# Patient Record
Sex: FEMALE | Race: WHITE | Employment: FULL TIME | URBAN - METROPOLITAN AREA
[De-identification: names, ages, dates, MRNs, and addresses within clinical notes are randomized per-mention and may not be internally consistent; named-entity substitution may affect disease eponyms.]

---

## 2022-01-17 ENCOUNTER — HOSPITAL ENCOUNTER (EMERGENCY)
Age: 62
Discharge: HOME | End: 2022-01-17
Payer: COMMERCIAL

## 2022-01-17 VITALS
HEIGHT: 69 IN | BODY MASS INDEX: 16.29 KG/M2 | SYSTOLIC BLOOD PRESSURE: 109 MMHG | OXYGEN SATURATION: 97 % | RESPIRATION RATE: 16 BRPM | TEMPERATURE: 98.5 F | HEART RATE: 73 BPM | DIASTOLIC BLOOD PRESSURE: 64 MMHG | WEIGHT: 110 LBS

## 2022-01-17 DIAGNOSIS — H66.90 ACUTE OTITIS MEDIA, UNSPECIFIED OTITIS MEDIA TYPE: Primary | ICD-10-CM

## 2022-01-17 PROCEDURE — 6370000000 HC RX 637 (ALT 250 FOR IP): Performed by: PHYSICIAN ASSISTANT

## 2022-01-17 PROCEDURE — 99283 EMERGENCY DEPT VISIT LOW MDM: CPT

## 2022-01-17 RX ORDER — AMOXICILLIN 500 MG/1
500 CAPSULE ORAL 3 TIMES DAILY
Qty: 21 CAPSULE | Refills: 0 | Status: SHIPPED | OUTPATIENT
Start: 2022-01-17 | End: 2022-01-24

## 2022-01-17 RX ADMIN — IBUPROFEN 600 MG: 200 TABLET, FILM COATED ORAL at 19:49

## 2022-01-17 ASSESSMENT — PAIN DESCRIPTION - LOCATION
LOCATION: EAR
LOCATION: EAR

## 2022-01-17 ASSESSMENT — PAIN DESCRIPTION - DIRECTION: RADIATING_TOWARDS: BILATERAL EAR PAIN

## 2022-01-17 ASSESSMENT — ENCOUNTER SYMPTOMS
VOMITING: 0
COUGH: 0
GASTROINTESTINAL NEGATIVE: 1
SORE THROAT: 0
RESPIRATORY NEGATIVE: 1
DIARRHEA: 0
SHORTNESS OF BREATH: 0

## 2022-01-17 ASSESSMENT — PAIN SCALES - GENERAL
PAINLEVEL_OUTOF10: 10

## 2022-01-17 ASSESSMENT — PAIN DESCRIPTION - PAIN TYPE: TYPE: ACUTE PAIN

## 2022-01-17 ASSESSMENT — PAIN DESCRIPTION - ORIENTATION: ORIENTATION: RIGHT;LEFT

## 2022-01-18 NOTE — ED PROVIDER NOTES
[address] [facility]          PHYSICIAN ASSISTANT NOTE       Date of evaluation: 1/17/2022    Chief Complaint     Otalgia (pt c/o pain in both ears with a change in hearing)      History of Present Illness     Alexa Ryder is a 64 y.o. female who presents with otalgia. Patient presents with bilateral ear pain for about 1 week. Patient reports she thinks she has bilateral ear infections. Patient reports she does not get ear infections very often. Patient reports she has had some nasal congestion and chills. No cough, chest pain, difficulty breathing. Patient has not taken anything for symptoms. Patient is not diabetic. Otherwise well. Review of Systems     Review of Systems   Constitutional: Negative. Negative for fever. HENT: Positive for ear pain. Negative for sore throat. Respiratory: Negative. Negative for cough and shortness of breath. Cardiovascular: Negative. Gastrointestinal: Negative. Negative for diarrhea and vomiting. Musculoskeletal: Negative. Skin: Negative. Neurological: Negative. All other systems reviewed and are negative. Past Medical, Surgical, Family, and Social History     She has a past medical history of Difficulty swallowing and GERD (gastroesophageal reflux disease). She has no past surgical history on file. Her family history is not on file. She reports that she has been smoking cigarettes. She has been smoking about 1.00 pack per day. She has never used smokeless tobacco. She reports that she does not drink alcohol and does not use drugs. Medications     Discharge Medication List as of 1/17/2022  7:43 PM      CONTINUE these medications which have NOT CHANGED    Details   HYDROcodone-acetaminophen (NORCO) 5-325 MG per tablet Take 1 tablet by mouth every 6 hours as needed. QUEtiapine (SEROQUEL) 50 MG tablet Take 50 mg by mouth every evening.       LORazepam (ATIVAN) 1 MG tablet Take 1 mg by mouth every 6 hours as needed. Allergies     She has No Known Allergies. Physical Exam     INITIAL VITALS: BP: 138/68, Temp: 98.5 °F (36.9 °C), Pulse: 79, Resp: 15, SpO2: 98 %  Physical Exam  Vitals and nursing note reviewed. Constitutional:       General: She is not in acute distress. Appearance: She is not ill-appearing. HENT:      Head: Normocephalic and atraumatic. Right Ear: Ear canal and external ear normal.      Left Ear: Ear canal and external ear normal.      Ears:      Comments: Bilateral tympanic membranes erythematous and dull. No tympanic membrane rupture visualized. No tenderness to tragus or mastoid. Eyes:      Extraocular Movements: Extraocular movements intact. Conjunctiva/sclera: Conjunctivae normal.      Pupils: Pupils are equal, round, and reactive to light. Cardiovascular:      Rate and Rhythm: Normal rate and regular rhythm. Pulmonary:      Effort: Pulmonary effort is normal.      Breath sounds: Normal breath sounds. Musculoskeletal:      Cervical back: Neck supple. Skin:     General: Skin is warm and dry. Neurological:      General: No focal deficit present. Mental Status: She is alert and oriented to person, place, and time. Mental status is at baseline. Psychiatric:         Mood and Affect: Mood normal.         Behavior: Behavior normal.         Thought Content: Thought content normal.         Judgment: Judgment normal.         Diagnostic Results     RADIOLOGY:  No orders to display       LABS:   No results found for this visit on 01/17/22. ED BEDSIDE ULTRASOUND:      RECENT VITALS:  BP: 109/64, Temp: 98.5 °F (36.9 °C), Pulse: 73, Resp: 16, SpO2: 97 %     Procedures         ED Course     Nursing Notes, Past Medical Hx,Past Surgical Hx, Social Hx, Allergies, and Family Hx were reviewed.     The patient was given the following medications:  Orders Placed This Encounter   Medications    amoxicillin (AMOXIL) 500 MG capsule     Sig: Take 1 capsule by mouth 3 times daily for 7 days     Dispense:  21 capsule     Refill:  0    ibuprofen (ADVIL;MOTRIN) tablet 600 mg       CONSULTS:  None    MEDICAL DECISION MAKING / ASSESSMENT / PLAN     Amanda Roman is a 64 y.o. female with ear pain. Patient is well-appearing and in no acute distress. Vitals are normal.  Physical exam is consistent with bilateral otitis media. Patient was offered an influenza and COVID swab but declines. Patient is systemically well and tolerating oral intake. No signs of mastoiditis. Patient be treated with antibiotics. Encourage follow-up with ENT. Return precautions discussed. Clinical Impression     1.  Acute otitis media, unspecified otitis media type        Disposition     PATIENT REFERRED TO:  Sylvie Renteria MD  [address]  [address] [phone]    Schedule an appointment as soon as possible for a visit       [facility] Emergency Department  [address]  [phone]    As needed, If symptoms worsen      DISCHARGE MEDICATIONS:  Discharge Medication List as of 1/17/2022  7:43 PM      START taking these medications    Details   amoxicillin (AMOXIL) 500 MG capsule Take 1 capsule by mouth 3 times daily for 7 days, Disp-21 capsule, R-0Print             DISPOSITION Decision To Discharge 01/17/2022 07:53:13 PM        Karime Vargas PA-C  01/17/22 4905

## 2022-01-18 NOTE — ED NOTES
All discharge paperwork and follow-up instructions reviewed with pt. Pt verbalized understanding. Pt ambulatory upon discharge in stable condition.        Cristiane Meraz RN  01/17/22 [id number]

## 2022-10-15 ENCOUNTER — HOSPITAL ENCOUNTER (EMERGENCY)
Age: 62
Discharge: LEFT BEFORE DISCHARGE | End: 2022-10-15
Attending: EMERGENCY MEDICINE
Payer: COMMERCIAL

## 2022-10-15 ENCOUNTER — APPOINTMENT (OUTPATIENT)
Dept: GENERAL RADIOLOGY | Age: 62
End: 2022-10-15
Payer: COMMERCIAL

## 2022-10-15 VITALS
SYSTOLIC BLOOD PRESSURE: 130 MMHG | WEIGHT: 110 LBS | HEART RATE: 76 BPM | TEMPERATURE: 98.2 F | OXYGEN SATURATION: 99 % | BODY MASS INDEX: 16.29 KG/M2 | RESPIRATION RATE: 16 BRPM | HEIGHT: 69 IN | DIASTOLIC BLOOD PRESSURE: 70 MMHG

## 2022-10-15 DIAGNOSIS — R06.02 SHORTNESS OF BREATH: Primary | ICD-10-CM

## 2022-10-15 LAB
A/G RATIO: 2.4 (ref 1.1–2.2)
ALBUMIN SERPL-MCNC: 4.6 G/DL (ref 3.4–5)
ALP BLD-CCNC: 63 U/L (ref 40–129)
ALT SERPL-CCNC: 12 U/L (ref 10–40)
ANION GAP SERPL CALCULATED.3IONS-SCNC: 6 MMOL/L (ref 3–16)
AST SERPL-CCNC: 15 U/L (ref 15–37)
BASOPHILS ABSOLUTE: 0 K/UL (ref 0–0.2)
BASOPHILS RELATIVE PERCENT: 0.7 %
BILIRUB SERPL-MCNC: <0.2 MG/DL (ref 0–1)
BUN BLDV-MCNC: 14 MG/DL (ref 7–20)
CALCIUM SERPL-MCNC: 9.2 MG/DL (ref 8.3–10.6)
CHLORIDE BLD-SCNC: 104 MMOL/L (ref 99–110)
CO2: 29 MMOL/L (ref 21–32)
CREAT SERPL-MCNC: 0.8 MG/DL (ref 0.6–1.2)
EOSINOPHILS ABSOLUTE: 0 K/UL (ref 0–0.6)
EOSINOPHILS RELATIVE PERCENT: 0.4 %
GFR AFRICAN AMERICAN: >60
GFR NON-AFRICAN AMERICAN: >60
GLUCOSE BLD-MCNC: 105 MG/DL (ref 70–99)
HCT VFR BLD CALC: 37.2 % (ref 36–48)
HEMOGLOBIN: 12.6 G/DL (ref 12–16)
INFLUENZA A: NOT DETECTED
INFLUENZA B: NOT DETECTED
LYMPHOCYTES ABSOLUTE: 0.7 K/UL (ref 1–5.1)
LYMPHOCYTES RELATIVE PERCENT: 13.1 %
MCH RBC QN AUTO: 30.4 PG (ref 26–34)
MCHC RBC AUTO-ENTMCNC: 33.8 G/DL (ref 31–36)
MCV RBC AUTO: 89.8 FL (ref 80–100)
MONOCYTES ABSOLUTE: 0.4 K/UL (ref 0–1.3)
MONOCYTES RELATIVE PERCENT: 7.3 %
NEUTROPHILS ABSOLUTE: 4 K/UL (ref 1.7–7.7)
NEUTROPHILS RELATIVE PERCENT: 78.5 %
PDW BLD-RTO: 14.3 % (ref 12.4–15.4)
PLATELET # BLD: 257 K/UL (ref 135–450)
PMV BLD AUTO: 7.9 FL (ref 5–10.5)
POTASSIUM REFLEX MAGNESIUM: 4 MMOL/L (ref 3.5–5.1)
RBC # BLD: 4.14 M/UL (ref 4–5.2)
SARS-COV-2 RNA, RT PCR: NOT DETECTED
SODIUM BLD-SCNC: 139 MMOL/L (ref 136–145)
TOTAL PROTEIN: 6.5 G/DL (ref 6.4–8.2)
TROPONIN: <0.01 NG/ML
WBC # BLD: 5.1 K/UL (ref 4–11)

## 2022-10-15 PROCEDURE — 84484 ASSAY OF TROPONIN QUANT: CPT

## 2022-10-15 PROCEDURE — 85025 COMPLETE CBC W/AUTO DIFF WBC: CPT

## 2022-10-15 PROCEDURE — 80053 COMPREHEN METABOLIC PANEL: CPT

## 2022-10-15 PROCEDURE — 94664 DEMO&/EVAL PT USE INHALER: CPT

## 2022-10-15 PROCEDURE — 93005 ELECTROCARDIOGRAM TRACING: CPT | Performed by: EMERGENCY MEDICINE

## 2022-10-15 PROCEDURE — 87636 SARSCOV2 & INF A&B AMP PRB: CPT

## 2022-10-15 PROCEDURE — 6360000002 HC RX W HCPCS: Performed by: EMERGENCY MEDICINE

## 2022-10-15 PROCEDURE — 94640 AIRWAY INHALATION TREATMENT: CPT

## 2022-10-15 PROCEDURE — 6370000000 HC RX 637 (ALT 250 FOR IP): Performed by: EMERGENCY MEDICINE

## 2022-10-15 PROCEDURE — 99284 EMERGENCY DEPT VISIT MOD MDM: CPT

## 2022-10-15 RX ORDER — ALBUTEROL SULFATE 2.5 MG/3ML
2.5 SOLUTION RESPIRATORY (INHALATION) ONCE
Status: COMPLETED | OUTPATIENT
Start: 2022-10-15 | End: 2022-10-15

## 2022-10-15 RX ORDER — IPRATROPIUM BROMIDE AND ALBUTEROL SULFATE 2.5; .5 MG/3ML; MG/3ML
1 SOLUTION RESPIRATORY (INHALATION) ONCE
Status: COMPLETED | OUTPATIENT
Start: 2022-10-15 | End: 2022-10-15

## 2022-10-15 RX ADMIN — ALBUTEROL SULFATE 2.5 MG: 2.5 SOLUTION RESPIRATORY (INHALATION) at 16:10

## 2022-10-15 RX ADMIN — ALBUTEROL SULFATE 2.5 MG: 2.5 SOLUTION RESPIRATORY (INHALATION) at 16:11

## 2022-10-15 RX ADMIN — IPRATROPIUM BROMIDE AND ALBUTEROL SULFATE 1 AMPULE: .5; 3 SOLUTION RESPIRATORY (INHALATION) at 16:10

## 2022-10-15 ASSESSMENT — PAIN - FUNCTIONAL ASSESSMENT: PAIN_FUNCTIONAL_ASSESSMENT: NONE - DENIES PAIN

## 2022-10-15 NOTE — ED PROVIDER NOTES
[address] [facility]          ATTENDING PHYSICIAN NOTE       Date of evaluation: 10/15/2022    Chief Complaint     Shortness of Breath (For about a month patient has been more SOB, +COPD, worse with exacerbation)      History of Present Illness     Jacy Urias is a 58 y.o. femalewith a past medical history of COPD, GERD, dysphagia presents to the emergency department today with a month of shortness of breath. This is getting progressively worse. She has a Combivent inhaler, she used it twice today. She is now out of her inhaler. Due to worsening shortness of breath, she is presenting to the emergency department. No sputum production. She has had intermittent cough. This does feel like her COPD but feels worse than other episodes that she has had in the past.  No orthopnea. She does have some discomfort in the center of her chest when she takes of breath. Nothing else makes it better or worse. Subjective fevers at home. She has not checked her temperature. She is eating and drinking normally. She is voiding normally and having normal bowel movements. No headache, vision changes, abdominal pain, nausea, vomiting, diarrhea, constipation. Review of Systems     As documented in HPI, otherwise all other systems were reviewed and negative    Past Medical, Surgical, Family, and Social History     She has a past medical history of Difficulty swallowing and GERD (gastroesophageal reflux disease). She has no past surgical history on file. Her family history is not on file. She reports that she has been smoking cigarettes. She has been smoking an average of 1 pack per day. She has never used smokeless tobacco. She reports that she does not drink alcohol and does not use drugs. Medications     Previous Medications    HYDROCODONE-ACETAMINOPHEN (NORCO) 5-325 MG PER TABLET    Take 1 tablet by mouth every 6 hours as needed.     LORAZEPAM (ATIVAN) 1 MG TABLET    Take 1 mg by mouth every 6 hours as needed. QUETIAPINE (SEROQUEL) 50 MG TABLET    Take 50 mg by mouth every evening. Allergies     She has No Known Allergies. Physical Exam     INITIAL VITALS: BP: 130/70, Temp: 98.2 °F (36.8 °C), Heart Rate: 70, Resp: 18, SpO2: 100 %   General: No acute distress  HEENT: head is atraumatic, pupils equal round and reactive to light, sclera are clear, oropharynx is nonerythematous  Neck: Supple, no lymphadenopathy  Chest: Nonlabored respirations, equal chest rise bilaterally, no accessory muscle use. Diminished breath sounds bilaterally. Cardiovascular: Normal rate, regular rhythm, 2+ radial pulses bilaterally  Abdominal: Soft, nontender, nondistended, no rebound or guarding  Back: No CVA tenderness bilaterally  Skin: Warm, dry, well-perfused, no rashes  Musculoskeletal: No obvious deformities, no tenderness to palpation diffusely  Neurologic: Alert and oriented x4, speech is clear and intact without dysarthria, moving all extremities normally  Psych: Anxious mood and affect  Diagnostic Results     EKG   Twelve-lead EKG performed on 10/15/2022 at 1549 hrs. Sinus rhythm at a rate of 62. Normal axis. Good R wave progression. No significant Q waves noted. No ST segment changes noted. No T wave inversions noted. QTc normal at 401. QRS normal at 84. FL interval normal at 130. No STEMI.     RADIOLOGY:  XR CHEST (2 VW)    (Results Pending)       LABS:   Results for orders placed or performed during the hospital encounter of 10/15/22   CBC with Auto Differential   Result Value Ref Range    WBC 5.1 4.0 - 11.0 K/uL    RBC 4.14 4.00 - 5.20 M/uL    Hemoglobin 12.6 12.0 - 16.0 g/dL    Hematocrit 37.2 36.0 - 48.0 %    MCV 89.8 80.0 - 100.0 fL    MCH 30.4 26.0 - 34.0 pg    MCHC 33.8 31.0 - 36.0 g/dL    RDW 14.3 12.4 - 15.4 %    Platelets 717 045 - 634 K/uL    MPV 7.9 5.0 - 10.5 fL    Neutrophils % 78.5 %    Lymphocytes % 13.1 %    Monocytes % 7.3 %    Eosinophils % 0.4 %    Basophils % 0.7 %    Neutrophils Absolute 4.0 1.7 - 7.7 K/uL    Lymphocytes Absolute 0.7 (L) 1.0 - 5.1 K/uL    Monocytes Absolute 0.4 0.0 - 1.3 K/uL    Eosinophils Absolute 0.0 0.0 - 0.6 K/uL    Basophils Absolute 0.0 0.0 - 0.2 K/uL   Troponin   Result Value Ref Range    Troponin <0.01 <0.01 ng/mL       ED BEDSIDE ULTRASOUND:  No results found. RECENT VITALS:  BP: 130/70,Temp: 98.2 °F (36.8 °C), Heart Rate: 76, Resp: 16, SpO2: 99 %     Procedures     Not applicable    ED Course     Nursing Notes, Past Medical Hx, Past Surgical Hx, Social Hx,Allergies, and Family Hx were reviewed. patient was given the following medications:  Orders Placed This Encounter   Medications    ipratropium-albuterol (DUONEB) nebulizer solution 1 ampule     Order Specific Question:   Initiate RT Bronchodilator Protocol     Answer:   No    albuterol (PROVENTIL) nebulizer solution 2.5 mg     Order Specific Question:   Initiate RT Bronchodilator Protocol     Answer:   No    albuterol (PROVENTIL) nebulizer solution 2.5 mg     Order Specific Question:   Initiate RT Bronchodilator Protocol     Answer:   No       CONSULTS:  None    MEDICAL DECISIONMAKING / ASSESSMENT / PLAN     Differential Diagnosis: COPD exacerbation, pneumonia, pneumothorax, ACS, arrhythmia, dehydration, electrolyte abnormality, COVID, influenza    Marleni Joshi is a 58 y.o. femalewith a past medical history of COPD, GERD, dysphagia presents to the emergency department today with a month of shortness of breath. She arrives to the emergency department her ABCs intact. Her vital signs are within normal limits. She has diminished breath sounds bilaterally. She does not appear to be in any obvious respiratory distress. Her Wells score for pulmonary embolism is 0, given that she has symptoms consistent with her prior COPD exacerbations, I feel that she does not require work-up for this today. I have ordered breathing treatments, basic labs.   CBC was negative for leukocytosis, anemia, thrombocytopenia. Prior to the remainder of her labs returning, was contacted by the RT who states while she was receiving the breathing treatments, she stated \"my daughters cannot leave me. \"  She then took off the breathing treatment and eloped from the emergency department. Clinical Impression     1. Shortness of breath        Disposition     PATIENT REFERRED TO:  No follow-up provider specified.     DISCHARGE MEDICATIONS:  New Prescriptions    No medications on file       DISPOSITION Eloped - Left Before Treatment Complete 10/15/2022 04:26:25 PM         Dwan Homans, MD  10/15/22 9940

## 2022-10-15 NOTE — PROGRESS NOTES
I was in the room with the patient giving her a breathing treatment when she starting acting nervous and anxious. The patient stated her daughter was outside and was going to leave her, the pt then just got up threw her leads and breathing treatment on the bed and ran out the door. I informed the MD of what happened.

## 2022-10-16 LAB
EKG ATRIAL RATE: 62 BPM
EKG DIAGNOSIS: NORMAL
EKG P AXIS: 80 DEGREES
EKG P-R INTERVAL: 130 MS
EKG Q-T INTERVAL: 396 MS
EKG QRS DURATION: 84 MS
EKG QTC CALCULATION (BAZETT): 401 MS
EKG R AXIS: 56 DEGREES
EKG T AXIS: 57 DEGREES
EKG VENTRICULAR RATE: 62 BPM

## 2024-03-10 ENCOUNTER — HOSPITAL ENCOUNTER (EMERGENCY)
Age: 64
Discharge: HOME | End: 2024-03-10
Attending: EMERGENCY MEDICINE
Payer: COMMERCIAL

## 2024-03-10 VITALS
SYSTOLIC BLOOD PRESSURE: 157 MMHG | TEMPERATURE: 98.5 F | HEART RATE: 73 BPM | OXYGEN SATURATION: 97 % | HEIGHT: 69 IN | WEIGHT: 97.3 LBS | BODY MASS INDEX: 14.41 KG/M2 | DIASTOLIC BLOOD PRESSURE: 76 MMHG | RESPIRATION RATE: 18 BRPM

## 2024-03-10 DIAGNOSIS — Z20.828 EXPOSURE TO INFLUENZA: Primary | ICD-10-CM

## 2024-03-10 LAB
BILIRUB UR QL STRIP.AUTO: NEGATIVE
CLARITY UR: CLEAR
COLOR UR: YELLOW
FLUAV RNA UPPER RESP QL NAA+PROBE: NEGATIVE
FLUBV AG NPH QL: NEGATIVE
GLUCOSE UR STRIP.AUTO-MCNC: NEGATIVE MG/DL
HGB UR QL STRIP.AUTO: NEGATIVE
KETONES UR STRIP.AUTO-MCNC: NEGATIVE MG/DL
LEUKOCYTE ESTERASE UR QL STRIP.AUTO: ABNORMAL
NITRITE UR QL STRIP.AUTO: NEGATIVE
PH UR STRIP.AUTO: 5.5 [PH] (ref 5–8)
PROT UR STRIP.AUTO-MCNC: NEGATIVE MG/DL
RBC #/AREA URNS HPF: NORMAL /HPF (ref 0–4)
SARS-COV-2 RDRP RESP QL NAA+PROBE: NOT DETECTED
SP GR UR STRIP.AUTO: 1.01 (ref 1–1.03)
UA COMPLETE W REFLEX CULTURE PNL UR: ABNORMAL
UA DIPSTICK W REFLEX MICRO PNL UR: YES
URN SPEC COLLECT METH UR: ABNORMAL
UROBILINOGEN UR STRIP-ACNC: 0.2 E.U./DL
WBC #/AREA URNS HPF: NORMAL /HPF (ref 0–5)

## 2024-03-10 PROCEDURE — 87804 INFLUENZA ASSAY W/OPTIC: CPT

## 2024-03-10 PROCEDURE — 87635 SARS-COV-2 COVID-19 AMP PRB: CPT

## 2024-03-10 PROCEDURE — 99283 EMERGENCY DEPT VISIT LOW MDM: CPT

## 2024-03-10 PROCEDURE — 81001 URINALYSIS AUTO W/SCOPE: CPT

## 2024-03-10 RX ORDER — OSELTAMIVIR PHOSPHATE 75 MG/1
75 CAPSULE ORAL 2 TIMES DAILY
Qty: 10 CAPSULE | Refills: 0 | Status: SHIPPED | OUTPATIENT
Start: 2024-03-10 | End: 2024-03-15

## 2024-03-10 ASSESSMENT — PAIN - FUNCTIONAL ASSESSMENT
PAIN_FUNCTIONAL_ASSESSMENT: ACTIVITIES ARE NOT PREVENTED
PAIN_FUNCTIONAL_ASSESSMENT: 0-10

## 2024-03-10 ASSESSMENT — PAIN DESCRIPTION - LOCATION: LOCATION: FLANK

## 2024-03-10 ASSESSMENT — PAIN SCALES - GENERAL: PAINLEVEL_OUTOF10: 9

## 2024-03-10 ASSESSMENT — PAIN DESCRIPTION - DESCRIPTORS: DESCRIPTORS: ACHING

## 2024-03-10 ASSESSMENT — PAIN DESCRIPTION - PAIN TYPE: TYPE: ACUTE PAIN

## 2024-03-10 ASSESSMENT — PAIN DESCRIPTION - FREQUENCY: FREQUENCY: CONTINUOUS

## 2024-03-10 ASSESSMENT — PAIN DESCRIPTION - ORIENTATION: ORIENTATION: RIGHT;LEFT

## 2024-03-10 NOTE — DISCHARGE INSTRUCTIONS
As discussed, you are started on the antiviral medicine for the fluid because of exposure.  Your flu test and COVID test were negative here however this may be falsely negative.  You may continue to take over-the-counter medicines.  Please follow-up with your primary care doctor.

## 2024-03-10 NOTE — ED PROVIDER NOTES
EMERGENCY DEPARTMENT ENCOUNTER     [facility] EMERGENCY DEPARTMENT     Pt Name: Danielle Cordoba   MRN: [id number]   Birthdate 1960   Date of evaluation: 3/10/2024   Provider: Miguel Londono MD   PCP: Caitlyn Cardona   Note Started: 3:59 PM EDT 3/10/24     CHIEF COMPLAINT     Chief Complaint   Patient presents with    Generalized Body Aches    Fatigue    Back Pain    Chills     Houseful of ill people her daughter is positive for Flu and COVID         HISTORY OF PRESENT ILLNESS:  History from : Patient   Limitations to history : None     Danielle Cordoba is a 63 y.o. female who presents for evaluation of fatigue, chills.  Patient reports that multiple individuals in her home have either flu or COVID.  Her symptoms started about 2 days ago.  She states that she has history of COPD however remains fairly active.  She denies any pain with urination.  No history of nausea vomiting.  No chest pain.    Nursing Notes were all reviewed and agreed with or any disagreements were addressed in the HPI.     ROS: Positives and Pertinent negatives as per HPI.    PAST MEDICAL HISTORY     Past medical history:  has a past medical history of COPD (chronic obstructive pulmonary disease) (HCC), Difficulty swallowing, and GERD (gastroesophageal reflux disease).    Past surgical history:  has no past surgical history on file.      PHYSICAL EXAM:  ED Triage Vitals [03/10/24 1522]   BP Temp Temp Source Pulse Respirations SpO2 Height Weight - Scale   (!) 157/76 98.5 °F (36.9 °C) Oral 73 18 97 % 1.753 m (5' 9\") 44.1 kg (97 lb 4.8 oz)        Physical Exam   No acute distress no respiratory distress clear breath sounds bilaterally.  There is no significant CVA tenderness.  Nontoxic in appearance conversant in full sentences.    DIAGNOSTIC RESULTS   LABS:   Labs Reviewed   URINALYSIS WITH REFLEX TO CULTURE - Abnormal; Notable for the following components:       Result Value    Leukocyte Esterase, Urine TRACE (*)     All

## 2024-04-28 ENCOUNTER — HOSPITAL ENCOUNTER (INPATIENT)
Age: 64
LOS: 1 days | Discharge: HOME | DRG: 463 | End: 2024-04-29
Attending: STUDENT IN AN ORGANIZED HEALTH CARE EDUCATION/TRAINING PROGRAM | Admitting: INTERNAL MEDICINE
Payer: COMMERCIAL

## 2024-04-28 ENCOUNTER — APPOINTMENT (OUTPATIENT)
Dept: CT IMAGING | Age: 64
DRG: 463 | End: 2024-04-28
Payer: COMMERCIAL

## 2024-04-28 DIAGNOSIS — N12 PYELONEPHRITIS: Primary | ICD-10-CM

## 2024-04-28 PROBLEM — R10.9 ACUTE LEFT FLANK PAIN: Status: ACTIVE | Noted: 2024-04-28

## 2024-04-28 PROBLEM — J44.9 COPD WITHOUT EXACERBATION (HCC): Status: ACTIVE | Noted: 2024-04-28

## 2024-04-28 LAB
ALBUMIN SERPL-MCNC: 4 G/DL (ref 3.4–5)
ALBUMIN/GLOB SERPL: 1.3 {RATIO} (ref 1.1–2.2)
ALP SERPL-CCNC: 90 U/L (ref 40–129)
ALT SERPL-CCNC: 10 U/L (ref 10–40)
ANION GAP SERPL CALCULATED.3IONS-SCNC: 11 MMOL/L (ref 3–16)
AST SERPL-CCNC: 10 U/L (ref 15–37)
BACTERIA URNS QL MICRO: ABNORMAL /HPF
BASOPHILS # BLD: 0.1 K/UL (ref 0–0.2)
BASOPHILS NFR BLD: 0.5 %
BILIRUB SERPL-MCNC: 0.6 MG/DL (ref 0–1)
BILIRUB UR QL STRIP.AUTO: NEGATIVE
BUN SERPL-MCNC: 12 MG/DL (ref 7–20)
CALCIUM SERPL-MCNC: 9.2 MG/DL (ref 8.3–10.6)
CHLORIDE SERPL-SCNC: 101 MMOL/L (ref 99–110)
CLARITY UR: CLEAR
CO2 SERPL-SCNC: 25 MMOL/L (ref 21–32)
COLOR UR: YELLOW
CREAT SERPL-MCNC: 0.8 MG/DL (ref 0.6–1.2)
DEPRECATED RDW RBC AUTO: 14.8 % (ref 12.4–15.4)
EOSINOPHIL # BLD: 0 K/UL (ref 0–0.6)
EOSINOPHIL NFR BLD: 0.1 %
EPI CELLS #/AREA URNS HPF: ABNORMAL /HPF (ref 0–5)
GFR SERPLBLD CREATININE-BSD FMLA CKD-EPI: 82 ML/MIN/{1.73_M2}
GLUCOSE SERPL-MCNC: 127 MG/DL (ref 70–99)
GLUCOSE UR STRIP.AUTO-MCNC: NEGATIVE MG/DL
HCT VFR BLD AUTO: 38.6 % (ref 36–48)
HGB BLD-MCNC: 12.8 G/DL (ref 12–16)
HGB UR QL STRIP.AUTO: ABNORMAL
KETONES UR STRIP.AUTO-MCNC: NEGATIVE MG/DL
LACTATE BLDV-SCNC: 0.9 MMOL/L (ref 0.4–2)
LEUKOCYTE ESTERASE UR QL STRIP.AUTO: ABNORMAL
LIPASE SERPL-CCNC: 14 U/L (ref 13–60)
LYMPHOCYTES # BLD: 0.9 K/UL (ref 1–5.1)
LYMPHOCYTES NFR BLD: 6.5 %
MCH RBC QN AUTO: 28.9 PG (ref 26–34)
MCHC RBC AUTO-ENTMCNC: 33.1 G/DL (ref 31–36)
MCV RBC AUTO: 87.3 FL (ref 80–100)
MONOCYTES # BLD: 1.3 K/UL (ref 0–1.3)
MONOCYTES NFR BLD: 8.9 %
NEUTROPHILS # BLD: 11.9 K/UL (ref 1.7–7.7)
NEUTROPHILS NFR BLD: 84 %
NITRITE UR QL STRIP.AUTO: POSITIVE
PH UR STRIP.AUTO: 7 [PH] (ref 5–8)
PLATELET # BLD AUTO: 361 K/UL (ref 135–450)
PMV BLD AUTO: 8 FL (ref 5–10.5)
POTASSIUM SERPL-SCNC: 4.3 MMOL/L (ref 3.5–5.1)
PROT SERPL-MCNC: 7.2 G/DL (ref 6.4–8.2)
PROT UR STRIP.AUTO-MCNC: 100 MG/DL
RBC # BLD AUTO: 4.42 M/UL (ref 4–5.2)
RBC #/AREA URNS HPF: >100 /HPF (ref 0–4)
SODIUM SERPL-SCNC: 137 MMOL/L (ref 136–145)
SP GR UR STRIP.AUTO: 1.02 (ref 1–1.03)
UA COMPLETE W REFLEX CULTURE PNL UR: YES
UA DIPSTICK W REFLEX MICRO PNL UR: YES
URN SPEC COLLECT METH UR: ABNORMAL
UROBILINOGEN UR STRIP-ACNC: 1 E.U./DL
WBC # BLD AUTO: 14.2 K/UL (ref 4–11)
WBC #/AREA URNS HPF: >100 /HPF (ref 0–5)

## 2024-04-28 PROCEDURE — 6360000002 HC RX W HCPCS: Performed by: INTERNAL MEDICINE

## 2024-04-28 PROCEDURE — 2580000003 HC RX 258: Performed by: INTERNAL MEDICINE

## 2024-04-28 PROCEDURE — 6370000000 HC RX 637 (ALT 250 FOR IP): Performed by: INTERNAL MEDICINE

## 2024-04-28 PROCEDURE — 87088 URINE BACTERIA CULTURE: CPT

## 2024-04-28 PROCEDURE — 96374 THER/PROPH/DIAG INJ IV PUSH: CPT

## 2024-04-28 PROCEDURE — 87086 URINE CULTURE/COLONY COUNT: CPT

## 2024-04-28 PROCEDURE — 96375 TX/PRO/DX INJ NEW DRUG ADDON: CPT

## 2024-04-28 PROCEDURE — 1200000000 HC SEMI PRIVATE

## 2024-04-28 PROCEDURE — 94640 AIRWAY INHALATION TREATMENT: CPT

## 2024-04-28 PROCEDURE — 85025 COMPLETE CBC W/AUTO DIFF WBC: CPT

## 2024-04-28 PROCEDURE — 2580000003 HC RX 258: Performed by: STUDENT IN AN ORGANIZED HEALTH CARE EDUCATION/TRAINING PROGRAM

## 2024-04-28 PROCEDURE — 87186 SC STD MICRODIL/AGAR DIL: CPT

## 2024-04-28 PROCEDURE — 99285 EMERGENCY DEPT VISIT HI MDM: CPT

## 2024-04-28 PROCEDURE — 83605 ASSAY OF LACTIC ACID: CPT

## 2024-04-28 PROCEDURE — 81001 URINALYSIS AUTO W/SCOPE: CPT

## 2024-04-28 PROCEDURE — 80053 COMPREHEN METABOLIC PANEL: CPT

## 2024-04-28 PROCEDURE — 6360000002 HC RX W HCPCS: Performed by: STUDENT IN AN ORGANIZED HEALTH CARE EDUCATION/TRAINING PROGRAM

## 2024-04-28 PROCEDURE — 83690 ASSAY OF LIPASE: CPT

## 2024-04-28 PROCEDURE — 74176 CT ABD & PELVIS W/O CONTRAST: CPT

## 2024-04-28 RX ORDER — MORPHINE SULFATE 4 MG/ML
4 INJECTION INTRAVENOUS ONCE
Status: COMPLETED | OUTPATIENT
Start: 2024-04-28 | End: 2024-04-28

## 2024-04-28 RX ORDER — CEFEPIME 1 G/50ML
2000 INJECTION, SOLUTION INTRAVENOUS EVERY 24 HOURS
Status: DISCONTINUED | OUTPATIENT
Start: 2024-04-29 | End: 2024-04-29 | Stop reason: HOSPADM

## 2024-04-28 RX ORDER — ONDANSETRON 2 MG/ML
4 INJECTION INTRAMUSCULAR; INTRAVENOUS EVERY 6 HOURS PRN
Status: DISCONTINUED | OUTPATIENT
Start: 2024-04-28 | End: 2024-04-29 | Stop reason: HOSPADM

## 2024-04-28 RX ORDER — CEFEPIME 1 G/50ML
2000 INJECTION, SOLUTION INTRAVENOUS ONCE
Status: COMPLETED | OUTPATIENT
Start: 2024-04-28 | End: 2024-04-28

## 2024-04-28 RX ORDER — ACETAMINOPHEN 650 MG/1
650 SUPPOSITORY RECTAL EVERY 6 HOURS PRN
Status: DISCONTINUED | OUTPATIENT
Start: 2024-04-28 | End: 2024-04-29 | Stop reason: HOSPADM

## 2024-04-28 RX ORDER — ACETAMINOPHEN 325 MG/1
650 TABLET ORAL EVERY 6 HOURS PRN
Status: DISCONTINUED | OUTPATIENT
Start: 2024-04-28 | End: 2024-04-29 | Stop reason: HOSPADM

## 2024-04-28 RX ORDER — GABAPENTIN 600 MG/1
800 TABLET ORAL 3 TIMES DAILY
COMMUNITY

## 2024-04-28 RX ORDER — POLYETHYLENE GLYCOL 3350 17 G/17G
17 POWDER, FOR SOLUTION ORAL DAILY PRN
Status: DISCONTINUED | OUTPATIENT
Start: 2024-04-28 | End: 2024-04-29 | Stop reason: HOSPADM

## 2024-04-28 RX ORDER — SODIUM CHLORIDE 0.9 % (FLUSH) 0.9 %
5-40 SYRINGE (ML) INJECTION EVERY 12 HOURS SCHEDULED
Status: DISCONTINUED | OUTPATIENT
Start: 2024-04-28 | End: 2024-04-29 | Stop reason: HOSPADM

## 2024-04-28 RX ORDER — SODIUM CHLORIDE 9 MG/ML
INJECTION, SOLUTION INTRAVENOUS CONTINUOUS
Status: DISCONTINUED | OUTPATIENT
Start: 2024-04-28 | End: 2024-04-29 | Stop reason: HOSPADM

## 2024-04-28 RX ORDER — SODIUM CHLORIDE, SODIUM LACTATE, POTASSIUM CHLORIDE, AND CALCIUM CHLORIDE .6; .31; .03; .02 G/100ML; G/100ML; G/100ML; G/100ML
1000 INJECTION, SOLUTION INTRAVENOUS ONCE
Status: COMPLETED | OUTPATIENT
Start: 2024-04-28 | End: 2024-04-28

## 2024-04-28 RX ORDER — ENOXAPARIN SODIUM 100 MG/ML
30 INJECTION SUBCUTANEOUS DAILY
Status: DISCONTINUED | OUTPATIENT
Start: 2024-04-29 | End: 2024-04-29 | Stop reason: HOSPADM

## 2024-04-28 RX ORDER — ONDANSETRON 4 MG/1
4 TABLET, ORALLY DISINTEGRATING ORAL EVERY 8 HOURS PRN
Status: DISCONTINUED | OUTPATIENT
Start: 2024-04-28 | End: 2024-04-29 | Stop reason: HOSPADM

## 2024-04-28 RX ORDER — SODIUM CHLORIDE 9 MG/ML
INJECTION, SOLUTION INTRAVENOUS PRN
Status: DISCONTINUED | OUTPATIENT
Start: 2024-04-28 | End: 2024-04-29 | Stop reason: HOSPADM

## 2024-04-28 RX ORDER — SODIUM CHLORIDE 0.9 % (FLUSH) 0.9 %
5-40 SYRINGE (ML) INJECTION PRN
Status: DISCONTINUED | OUTPATIENT
Start: 2024-04-28 | End: 2024-04-29 | Stop reason: HOSPADM

## 2024-04-28 RX ORDER — MORPHINE SULFATE 2 MG/ML
4 INJECTION, SOLUTION INTRAMUSCULAR; INTRAVENOUS EVERY 4 HOURS PRN
Status: DISCONTINUED | OUTPATIENT
Start: 2024-04-28 | End: 2024-04-29 | Stop reason: HOSPADM

## 2024-04-28 RX ORDER — LORAZEPAM 1 MG/1
1 TABLET ORAL EVERY 6 HOURS PRN
Status: DISCONTINUED | OUTPATIENT
Start: 2024-04-28 | End: 2024-04-29 | Stop reason: HOSPADM

## 2024-04-28 RX ORDER — HYDROCODONE BITARTRATE AND ACETAMINOPHEN 5; 325 MG/1; MG/1
1 TABLET ORAL EVERY 6 HOURS PRN
Status: DISCONTINUED | OUTPATIENT
Start: 2024-04-28 | End: 2024-04-29 | Stop reason: HOSPADM

## 2024-04-28 RX ORDER — QUETIAPINE FUMARATE 25 MG/1
50 TABLET, FILM COATED ORAL EVERY EVENING
Status: DISCONTINUED | OUTPATIENT
Start: 2024-04-28 | End: 2024-04-29 | Stop reason: HOSPADM

## 2024-04-28 RX ORDER — ALBUTEROL SULFATE 2.5 MG/3ML
2.5 SOLUTION RESPIRATORY (INHALATION) EVERY 6 HOURS PRN
Status: DISCONTINUED | OUTPATIENT
Start: 2024-04-28 | End: 2024-04-29 | Stop reason: HOSPADM

## 2024-04-28 RX ADMIN — WATER 1000 MG: 1 INJECTION INTRAMUSCULAR; INTRAVENOUS; SUBCUTANEOUS at 17:15

## 2024-04-28 RX ADMIN — ALBUTEROL SULFATE 2.5 MG: 2.5 SOLUTION RESPIRATORY (INHALATION) at 21:51

## 2024-04-28 RX ADMIN — ONDANSETRON 4 MG: 2 INJECTION INTRAMUSCULAR; INTRAVENOUS at 20:41

## 2024-04-28 RX ADMIN — SODIUM CHLORIDE, POTASSIUM CHLORIDE, SODIUM LACTATE AND CALCIUM CHLORIDE 1000 ML: 600; 310; 30; 20 INJECTION, SOLUTION INTRAVENOUS at 16:00

## 2024-04-28 RX ADMIN — LORAZEPAM 1 MG: 1 TABLET ORAL at 21:18

## 2024-04-28 RX ADMIN — CEFEPIME 2000 MG: 1 INJECTION, SOLUTION INTRAVENOUS at 22:18

## 2024-04-28 RX ADMIN — SODIUM CHLORIDE, POTASSIUM CHLORIDE, SODIUM LACTATE AND CALCIUM CHLORIDE 1000 ML: 600; 310; 30; 20 INJECTION, SOLUTION INTRAVENOUS at 17:23

## 2024-04-28 RX ADMIN — SODIUM CHLORIDE: 9 INJECTION, SOLUTION INTRAVENOUS at 21:25

## 2024-04-28 RX ADMIN — HYDROCODONE BITARTRATE AND ACETAMINOPHEN 1 TABLET: 5; 325 TABLET ORAL at 21:19

## 2024-04-28 RX ADMIN — ACETAMINOPHEN 650 MG: 325 TABLET ORAL at 20:35

## 2024-04-28 RX ADMIN — MORPHINE SULFATE 4 MG: 4 INJECTION INTRAVENOUS at 17:16

## 2024-04-28 RX ADMIN — SODIUM CHLORIDE, PRESERVATIVE FREE 10 ML: 5 INJECTION INTRAVENOUS at 21:19

## 2024-04-28 ASSESSMENT — PAIN DESCRIPTION - FREQUENCY
FREQUENCY: CONTINUOUS
FREQUENCY: CONTINUOUS

## 2024-04-28 ASSESSMENT — PAIN SCALES - GENERAL
PAINLEVEL_OUTOF10: 10
PAINLEVEL_OUTOF10: 8
PAINLEVEL_OUTOF10: 10
PAINLEVEL_OUTOF10: 0

## 2024-04-28 ASSESSMENT — PAIN DESCRIPTION - PAIN TYPE
TYPE: ACUTE PAIN
TYPE: ACUTE PAIN

## 2024-04-28 ASSESSMENT — PAIN DESCRIPTION - ORIENTATION
ORIENTATION: LEFT
ORIENTATION: LEFT

## 2024-04-28 ASSESSMENT — PAIN - FUNCTIONAL ASSESSMENT
PAIN_FUNCTIONAL_ASSESSMENT: 0-10
PAIN_FUNCTIONAL_ASSESSMENT: ACTIVITIES ARE NOT PREVENTED

## 2024-04-28 ASSESSMENT — PAIN DESCRIPTION - LOCATION
LOCATION: FLANK
LOCATION: FLANK

## 2024-04-28 ASSESSMENT — LIFESTYLE VARIABLES: HOW OFTEN DO YOU HAVE A DRINK CONTAINING ALCOHOL: NEVER

## 2024-04-28 ASSESSMENT — PAIN DESCRIPTION - ONSET
ONSET: ON-GOING
ONSET: ON-GOING

## 2024-04-28 ASSESSMENT — PAIN DESCRIPTION - DESCRIPTORS
DESCRIPTORS: ACHING;DISCOMFORT;SHARP
DESCRIPTORS: PRESSURE;SHARP

## 2024-04-28 NOTE — ED NOTES
ED TO INPATIENT SBAR HANDOFF    Patient Name: Danielle Cordoba   :  1960  63 y.o.   MRN:  [id number]  Preferred Name    ED Room #:  B23/B23-  Family/Caregiver Present yes   Restraints no   Sitter no   Sepsis Risk Score Sepsis Risk Score: 1.6    Situation  Code Status: No Order .    Allergies: Patient has no known allergies.  Weight: Patient Vitals for the past 96 hrs (Last 3 readings):   Weight   24 1540 43.3 kg (95 lb 6.4 oz)     Arrived from: home  Chief Complaint:   Chief Complaint   Patient presents with    Flank Pain     Difficulty urinating some blood     Hospital Problem/Diagnosis:  Principal Problem:    Pyelonephritis of left kidney  Active Problems:    Acute left flank pain    COPD without exacerbation (HCC)  Resolved Problems:    * No resolved hospital problems. *    Imaging:   CT ABDOMEN PELVIS WO CONTRAST Additional Contrast? None   Final Result         1. 5 mm right lower lobe pulmonary nodule incidentally identified.   2. No acute intra-abdominal pathology.         Electronically signed by Jesus Burns        Abnormal labs:   Abnormal Labs Reviewed   CBC WITH AUTO DIFFERENTIAL - Abnormal; Notable for the following components:       Result Value    WBC 14.2 (*)     Neutrophils Absolute 11.9 (*)     Lymphocytes Absolute 0.9 (*)     All other components within normal limits   COMPREHENSIVE METABOLIC PANEL W/ REFLEX TO MG FOR LOW K - Abnormal; Notable for the following components:    Glucose 127 (*)     AST 10 (*)     All other components within normal limits   URINALYSIS WITH REFLEX TO CULTURE - Abnormal; Notable for the following components:    Blood, Urine LARGE (*)     Protein,  (*)     Nitrite, Urine POSITIVE (*)     Leukocyte Esterase, Urine LARGE (*)     All other components within normal limits   MICROSCOPIC URINALYSIS - Abnormal; Notable for the following components:    WBC, UA >100 (*)     RBC, UA >100 (*)     Bacteria, UA 3+ (*)     All other components within normal  limits     Critical values: no     Abnormal Assessment Findings:     Background  History:   Past Medical History:   Diagnosis Date    COPD (chronic obstructive pulmonary disease) (HCC)     Difficulty swallowing     when she got her dentures    GERD (gastroesophageal reflux disease)        Assessment    Vitals/MEWS:    Level of Consciousness: Alert (0)   Vitals:    04/28/24 1540 04/28/24 1541 04/28/24 1730   BP:  117/72 (!) 146/79   Pulse:  (!) 105    Resp:  17    Temp: 98.3 °F (36.8 °C)     TempSrc: Oral     SpO2:  97%    Weight: 43.3 kg (95 lb 6.4 oz)     Height: 1.753 m (5' 9\")       FiO2 (%):   O2 Flow Rate: O2 Device: None (Room air)    Cardiac Rhythm:    Pain Assessment: [x] Verbal [] Odom Nolan Scale  Pain Scale: Pain Assessment  Pain Assessment: 0-10  Pain Level: 8  Pain Location: Flank  Pain Orientation: Left  Pain Descriptors: Pressure, Sharp  Pain Type: Acute pain  Pain Frequency: Continuous  Pain Onset: On-going  Last documented pain score (0-10 scale) Pain Level: 8  Last documented pain medication administered: Check MAR  Mental Status: oriented and alert  Orientation Level:    NIH Score:    C-SSRS: Risk of Suicide: No Risk  Bedside swallow:    [place] Coma Scale (GCS):    Active LDA's:   Peripheral IV 04/28/24 Right Forearm (Active)     PO Status: Check orders  Pertinent or High Risk Medications/Drips: no   If Yes, please provide details:   Pending Blood Product Administration: no       You may also review the ED PT Care Timeline found under the Summary Nursing Index tab.    Recommendation    Pending orders N/A  Plan for Discharge (if known):   Additional Comments: pt is Aox4, Independent from home  If any further questions, please call Sending RN at [phone]    Electronically signed by: Electronically signed by Chiquita Warren RN on 4/28/2024 at 5:58 PM      Chiquita Warren RN  04/28/24 7747

## 2024-04-28 NOTE — H&P
Hospital Medicine History & Physical      PCP: Caitlyn Cardona MD    Date of Admission: 4/28/2024    Date of Service: Pt seen/examined on 4/28/2024 and Admitted to Inpatient with expected LOS greater than two midnights due to medical therapy.     Chief Complaint:    Chief Complaint   Patient presents with    Flank Pain     Difficulty urinating some blood         History Of Present Illness:    The patient is a 63 y.o. female with history of COPD, GERD who presented with 3-day history of left flank pain radiating down to the left lumbar area with associated chills but no overt fevers, dysuria with hematuria.   In the ER, CT abdomen pelvis was unremarkable for acute pathology but incidental right lower lobe pulmonary nodule  Urinalysis was grossly positive suggestive of UTI and possible pyelonephritis  She has got positive ballottement of the left costophrenic angle      Past Medical History:        Diagnosis Date    COPD (chronic obstructive pulmonary disease) (HCC)     Difficulty swallowing     when she got her dentures    GERD (gastroesophageal reflux disease)        Past Surgical History:    History reviewed. No pertinent surgical history.    Medications Prior to Admission:    Prior to Admission medications    Medication Sig Start Date End Date Taking? Authorizing Provider   HYDROcodone-acetaminophen (NORCO) 5-325 MG per tablet Take 1 tablet by mouth every 6 hours as needed.    ProviderKayy MD   QUEtiapine (SEROQUEL) 50 MG tablet Take 50 mg by mouth every evening.    ProviderKayy MD   LORazepam (ATIVAN) 1 MG tablet Take 1 mg by mouth every 6 hours as needed.    ProviderKayy MD       Allergies:  Patient has no known allergies.    Social History:  The patient currently lives with family    TOBACCO:   reports that she has been smoking cigarettes. She has never used smokeless tobacco.  ETOH:   reports no history of alcohol use.      Family History:  Reviewed in detail and  negative for DM, Early CAD, Cancer, CVA. Positive as follows:    History reviewed. No pertinent family history.    REVIEW OF SYSTEMS:   Positive for left flank tenderness with associated dysuria, hematuria and frequency, chills and as noted in the HPI. All other systems reviewed and negative.    PHYSICAL EXAM:  BP (!) 146/79   Pulse (!) 105   Temp 98.3 °F (36.8 °C) (Oral)   Resp 17   Ht 1.753 m (5' 9\")   Wt 43.3 kg (95 lb 6.4 oz)   SpO2 97%   BMI 14.09 kg/m²     General appearance:  Alert and oriented, No apparent distress and cooperative.  HEENT Normal cephalic, atraumatic without obvious deformity.  Conjunctivae/corneas clear.  Neck: Supple, No jugular venous distention/bruits.    Lungs: Clear to auscultation, bilaterally without Rales/Wheezes/Rhonchi with good respiratory effort.  Heart: Regular rate and rhythm with Normal S1/S2 without murmurs, rubs or gallops  Abdomen: Soft, left CVA tenderness, non-distended without rigidity or guarding and positive bowel sounds  Extremities: No clubbing, cyanosis, or edema bilaterally.    Skin: Skin color, texture, turgor normal.  No rashes or lesions.  Neurologic:, neurovascularly intact with sensory/motor intact upper extremities/lower extremities, bilaterally.  Cranial nerves: II-XII intact, grossly non-focal. Pupils equal, round, and reactive to light.  Extra ocular muscles intact.        CBC   Recent Labs     04/28/24  1555   WBC 14.2*   HGB 12.8   HCT 38.6         RENAL  Recent Labs     04/28/24  1555      K 4.3      CO2 25   BUN 12   CREATININE 0.8     LFT'S  Recent Labs     04/28/24  1555   AST 10*   ALT 10   BILITOT 0.6   ALKPHOS 90       U/A:    Lab Results   Component Value Date/Time    COLORU Yellow 04/28/2024 04:02 PM    WBCUA >100 04/28/2024 04:02 PM    RBCUA >100 04/28/2024 04:02 PM    BACTERIA 3+ 04/28/2024 04:02 PM    CLARITYU Clear 04/28/2024 04:02 PM    SPECGRAV 1.020 04/28/2024 04:02 PM    LEUKOCYTESUR LARGE 04/28/2024 04:02 PM

## 2024-04-28 NOTE — ED PROVIDER NOTES
THE [facility]  EMERGENCY DEPARTMENT ENCOUNTER          ATTENDING PHYSICIAN NOTE       Date of evaluation: 4/28/2024    Chief Complaint     Flank Pain (Difficulty urinating some blood)      History of Present Illness     Danielle Cordoba is a 63 y.o. female who presents with L sided flank pain and hematuria.  Patient states that her symptoms been present for the past few days.  She has not had any fevers.  States that she feels like she cannot empty her kidneys and she has been having some blood with urination.  Denies prior history of similar.      Physical Exam     INITIAL VITALS: BP: 117/72, Temp: 98.3 °F (36.8 °C), Pulse: (!) 105, Respirations: 17, SpO2: 97 %   Physical Exam  Vitals and nursing note reviewed.   Constitutional:       General: She is not in acute distress.     Appearance: Normal appearance. She is normal weight.   HENT:      Head: Normocephalic and atraumatic.   Cardiovascular:      Rate and Rhythm: Normal rate.   Abdominal:      General: Abdomen is flat.      Tenderness: There is abdominal tenderness (L flank tenderness). There is left CVA tenderness. There is no guarding or rebound.   Skin:     General: Skin is warm and dry.   Neurological:      General: No focal deficit present.      Mental Status: She is alert. Mental status is at baseline.   Psychiatric:         Mood and Affect: Mood normal.         Behavior: Behavior normal.         ASSESSMENT / PLAN  (MEDICAL DECISION MAKING)     INITIAL VITALS: BP: 117/72, Temp: 98.3 °F (36.8 °C), Pulse: (!) 105, Respirations: 17, SpO2: 97 %      Danielle Cordoba is a 63 y.o. female who presents left-sided flank pain.  Patient appears uncomfortable on exam.  She has left CVA tenderness on exam.  I reviewed interpreted patient's CT on pelvis which is unremarkable.  There is no evidence of kidney stone.  White count of 14.2.  CMP unremarkable.  Urinalysis shows large amount of blood with positive nitrites and large leukocyte esterases.  Presentation  117/72,Temp: 98.3 °F (36.8 °C), Pulse: (!) 105, Respirations: 17, SpO2: 97 %     Procedures         ED Course     Nursing Notes, Past Medical Hx, Past Surgical Hx, Social Hx,Allergies, and Family Hx were reviewed.    The patient was given the following medications:  Orders Placed This Encounter   Medications    lactated ringers bolus 1,000 mL    cefTRIAXone (ROCEPHIN) 1,000 mg in sterile water 10 mL IV syringe     Order Specific Question:   Antimicrobial Indications     Answer:   Urinary Tract Infection    morphine injection 4 mg    lactated ringers bolus 1,000 mL       CONSULTS:  None    Review of Systems     Review of Systems    Past Medical, Surgical, Family, and Social History     She has a past medical history of COPD (chronic obstructive pulmonary disease) ([facility]), Difficulty swallowing, and GERD (gastroesophageal reflux disease).  She has no past surgical history on file.  Her family history is not on file.  She reports that she has been smoking cigarettes. She has never used smokeless tobacco. She reports that she does not drink alcohol and does not use drugs.    Medications     Previous Medications    HYDROCODONE-ACETAMINOPHEN (NORCO) 5-325 MG PER TABLET    Take 1 tablet by mouth every 6 hours as needed.    LORAZEPAM (ATIVAN) 1 MG TABLET    Take 1 mg by mouth every 6 hours as needed.    QUETIAPINE (SEROQUEL) 50 MG TABLET    Take 50 mg by mouth every evening.       Allergies     She has No Known Allergies.               David Be MD  04/28/24 [id number]

## 2024-04-29 VITALS
SYSTOLIC BLOOD PRESSURE: 120 MMHG | HEIGHT: 69 IN | BODY MASS INDEX: 14.13 KG/M2 | WEIGHT: 95.4 LBS | DIASTOLIC BLOOD PRESSURE: 64 MMHG | OXYGEN SATURATION: 100 % | TEMPERATURE: 98 F | RESPIRATION RATE: 16 BRPM | HEART RATE: 73 BPM

## 2024-04-29 LAB
ANION GAP SERPL CALCULATED.3IONS-SCNC: 4 MMOL/L (ref 3–16)
BASOPHILS # BLD: 0.1 K/UL (ref 0–0.2)
BASOPHILS NFR BLD: 0.5 %
BUN SERPL-MCNC: 15 MG/DL (ref 7–20)
CALCIUM SERPL-MCNC: 8.6 MG/DL (ref 8.3–10.6)
CHLORIDE SERPL-SCNC: 106 MMOL/L (ref 99–110)
CO2 SERPL-SCNC: 27 MMOL/L (ref 21–32)
CREAT SERPL-MCNC: 0.8 MG/DL (ref 0.6–1.2)
DEPRECATED RDW RBC AUTO: 14.7 % (ref 12.4–15.4)
EOSINOPHIL # BLD: 0 K/UL (ref 0–0.6)
EOSINOPHIL NFR BLD: 0.3 %
GFR SERPLBLD CREATININE-BSD FMLA CKD-EPI: 82 ML/MIN/{1.73_M2}
GLUCOSE SERPL-MCNC: 97 MG/DL (ref 70–99)
HCT VFR BLD AUTO: 34.2 % (ref 36–48)
HGB BLD-MCNC: 11.5 G/DL (ref 12–16)
LYMPHOCYTES # BLD: 1.3 K/UL (ref 1–5.1)
LYMPHOCYTES NFR BLD: 12.4 %
MCH RBC QN AUTO: 29.7 PG (ref 26–34)
MCHC RBC AUTO-ENTMCNC: 33.5 G/DL (ref 31–36)
MCV RBC AUTO: 88.6 FL (ref 80–100)
MONOCYTES # BLD: 0.9 K/UL (ref 0–1.3)
MONOCYTES NFR BLD: 8.3 %
NEUTROPHILS # BLD: 8.4 K/UL (ref 1.7–7.7)
NEUTROPHILS NFR BLD: 78.5 %
PLATELET # BLD AUTO: 265 K/UL (ref 135–450)
PMV BLD AUTO: 8.2 FL (ref 5–10.5)
POTASSIUM SERPL-SCNC: 3.6 MMOL/L (ref 3.5–5.1)
RBC # BLD AUTO: 3.86 M/UL (ref 4–5.2)
SODIUM SERPL-SCNC: 137 MMOL/L (ref 136–145)
WBC # BLD AUTO: 10.7 K/UL (ref 4–11)

## 2024-04-29 PROCEDURE — 80048 BASIC METABOLIC PNL TOTAL CA: CPT

## 2024-04-29 PROCEDURE — 6370000000 HC RX 637 (ALT 250 FOR IP): Performed by: INTERNAL MEDICINE

## 2024-04-29 PROCEDURE — 6360000002 HC RX W HCPCS: Performed by: NURSE PRACTITIONER

## 2024-04-29 PROCEDURE — 36415 COLL VENOUS BLD VENIPUNCTURE: CPT

## 2024-04-29 PROCEDURE — 85025 COMPLETE CBC W/AUTO DIFF WBC: CPT

## 2024-04-29 PROCEDURE — 6360000002 HC RX W HCPCS: Performed by: INTERNAL MEDICINE

## 2024-04-29 PROCEDURE — 2580000003 HC RX 258: Performed by: INTERNAL MEDICINE

## 2024-04-29 RX ORDER — KETOROLAC TROMETHAMINE 30 MG/ML
15 INJECTION, SOLUTION INTRAMUSCULAR; INTRAVENOUS ONCE
Status: COMPLETED | OUTPATIENT
Start: 2024-04-29 | End: 2024-04-29

## 2024-04-29 RX ORDER — CIPROFLOXACIN 500 MG/1
500 TABLET, FILM COATED ORAL 2 TIMES DAILY
Qty: 14 TABLET | Refills: 0 | Status: SHIPPED | OUTPATIENT
Start: 2024-04-29 | End: 2024-04-29

## 2024-04-29 RX ORDER — CIPROFLOXACIN 500 MG/1
500 TABLET, FILM COATED ORAL 2 TIMES DAILY
Qty: 14 TABLET | Refills: 0 | Status: SHIPPED | OUTPATIENT
Start: 2024-04-29 | End: 2024-05-06

## 2024-04-29 RX ADMIN — SODIUM CHLORIDE: 9 INJECTION, SOLUTION INTRAVENOUS at 07:18

## 2024-04-29 RX ADMIN — CEFEPIME 2000 MG: 1 INJECTION, SOLUTION INTRAVENOUS at 17:31

## 2024-04-29 RX ADMIN — ENOXAPARIN SODIUM 30 MG: 100 INJECTION SUBCUTANEOUS at 09:56

## 2024-04-29 RX ADMIN — LORAZEPAM 1 MG: 1 TABLET ORAL at 09:54

## 2024-04-29 RX ADMIN — KETOROLAC TROMETHAMINE 15 MG: 30 INJECTION, SOLUTION INTRAMUSCULAR; INTRAVENOUS at 13:07

## 2024-04-29 RX ADMIN — HYDROCODONE BITARTRATE AND ACETAMINOPHEN 1 TABLET: 5; 325 TABLET ORAL at 18:08

## 2024-04-29 RX ADMIN — HYDROCODONE BITARTRATE AND ACETAMINOPHEN 1 TABLET: 5; 325 TABLET ORAL at 03:26

## 2024-04-29 RX ADMIN — HYDROCODONE BITARTRATE AND ACETAMINOPHEN 1 TABLET: 5; 325 TABLET ORAL at 09:56

## 2024-04-29 RX ADMIN — LORAZEPAM 1 MG: 1 TABLET ORAL at 03:26

## 2024-04-29 RX ADMIN — LORAZEPAM 1 MG: 1 TABLET ORAL at 18:08

## 2024-04-29 ASSESSMENT — PAIN SCALES - GENERAL
PAINLEVEL_OUTOF10: 9
PAINLEVEL_OUTOF10: 8
PAINLEVEL_OUTOF10: 9
PAINLEVEL_OUTOF10: 0

## 2024-04-29 ASSESSMENT — PAIN DESCRIPTION - ORIENTATION
ORIENTATION: LEFT

## 2024-04-29 ASSESSMENT — PAIN DESCRIPTION - LOCATION
LOCATION: FLANK

## 2024-04-29 ASSESSMENT — PAIN DESCRIPTION - FREQUENCY: FREQUENCY: INTERMITTENT

## 2024-04-29 ASSESSMENT — PAIN - FUNCTIONAL ASSESSMENT
PAIN_FUNCTIONAL_ASSESSMENT: ACTIVITIES ARE NOT PREVENTED
PAIN_FUNCTIONAL_ASSESSMENT: ACTIVITIES ARE NOT PREVENTED

## 2024-04-29 ASSESSMENT — PAIN DESCRIPTION - PAIN TYPE
TYPE: ACUTE PAIN
TYPE: ACUTE PAIN

## 2024-04-29 ASSESSMENT — PAIN DESCRIPTION - DESCRIPTORS
DESCRIPTORS: SHOOTING
DESCRIPTORS: ACHING;DISCOMFORT

## 2024-04-29 ASSESSMENT — PAIN DESCRIPTION - ONSET: ONSET: AWAKENED FROM SLEEP

## 2024-04-29 NOTE — DISCHARGE SUMMARY
hepatic masses. Bile Ducts: Normal caliber. Gallbladder: No calcified gallstones. Normal caliber wall. Pancreas: Pancreas is not well seen without intravascular contrast no definite duct dilatation or mass Spleen: No evidence of splenomegaly. Adrenals: No suspicious adrenal enlargement. Kidneys: No hydronephrosis.  No suspicious masses.  No evidence of nephrolithiasis.  Limited evaluation due to lack of intravenous contrast. Pelvis: Reproductive Organs: No pelvic masses. Ureters: No ureteral dilatation or calculi. Bladder: within normal limits. Appendix: The appendix is not visualized no definite inflammatory changes Bowel: Normal caliber. Mesenteric Lymph Nodes: No enlarged mesenteric lymph nodes. Peritoneum: No ascites or free air, no fluid collection. Vessels: Atherosclerotic changes . Retroperitoneum: No retroperitoneal masses. No suspicious lymphadenopathy. Abdominal Wall: No large hernias or abdominal wall masses. Bones: Mild degenerative change lumbar spine. No osseous destructive lesions.      1. 5 mm right lower lobe pulmonary nodule incidentally identified. 2. No acute intra-abdominal pathology. Electronically signed by Jesus Burns      CBC:   Recent Labs     04/28/24  1555 04/29/24  0739   WBC 14.2* 10.7   HGB 12.8 11.5*    265     BMP:    Recent Labs     04/28/24  1555 04/29/24  0739    137   K 4.3 3.6    106   CO2 25 27   BUN 12 15   CREATININE 0.8 0.8   GLUCOSE 127* 97     Hepatic:   Recent Labs     04/28/24  1555   AST 10*   ALT 10   BILITOT 0.6   ALKPHOS 90     Lipids: No results found for: \"CHOL\", \"HDL\", \"TRIG\"  Hemoglobin A1C: No results found for: \"LABA1C\"  TSH: No results found for: \"TSH\"  Troponin: No results found for: \"TROPONINT\"  Lactic Acid:   Recent Labs     04/28/24  1725   LACTA 0.9     BNP: No results for input(s): \"PROBNP\" in the last 72 hours.  UA:  Lab Results   Component Value Date/Time    NITRU POSITIVE 04/28/2024 04:02 PM    COLORU Yellow 04/28/2024 04:02  PM    PHUR 7.0 04/28/2024 04:02 PM    WBCUA >100 04/28/2024 04:02 PM    RBCUA >100 04/28/2024 04:02 PM    BACTERIA 3+ 04/28/2024 04:02 PM    CLARITYU Clear 04/28/2024 04:02 PM    SPECGRAV 1.020 04/28/2024 04:02 PM    LEUKOCYTESUR LARGE 04/28/2024 04:02 PM    UROBILINOGEN 1.0 04/28/2024 04:02 PM    BILIRUBINUR Negative 04/28/2024 04:02 PM    BLOODU LARGE 04/28/2024 04:02 PM    GLUCOSEU Negative 04/28/2024 04:02 PM    KETUA Negative 04/28/2024 04:02 PM     Urine Cultures:   Lab Results   Component Value Date/Time    LABURIN >100,000 CFU/ml  Sensitivity to follow   04/28/2024 04:02 PM     Blood Cultures: No results found for: \"BC\"  No results found for: \"BLOODCULT2\"  Organism:   Lab Results   Component Value Date/Time    ORG Escherichia coli 04/28/2024 04:02 PM       Time Spent Discharging patient > 31 minutes    Electronically signed by RICHARD Burgess CNP on 4/29/2024 at 3:57 PM

## 2024-04-29 NOTE — PROGRESS NOTES
4 Eyes Skin Assessment     NAME:  Danielle Cordoba  YOB: 1960  MEDICAL RECORD NUMBER:  [id number]    The patient is being assessed for  Admission    I agree that at least one RN has performed a thorough Head to Toe Skin Assessment on the patient. ALL assessment sites listed below have been assessed.      Areas assessed by both nurses:    Head, Face, Ears, Shoulders, Back, Chest, Arms, Elbows, Hands, Sacrum. Buttock, Coccyx, Ischium, Legs. Feet and Heels, and Under Medical Devices         Does the Patient have a Wound? No noted wound(s)       Osvaldo Prevention initiated by RN: No  Wound Care Orders initiated by RN: No    Pressure Injury (Stage 3,4, Unstageable, DTI, NWPT, and Complex wounds) if present, place Wound referral order by RN under : No    New Ostomies, if present place, Ostomy referral order under : No     Nurse 1 eSignature: Electronically signed by Katie De La Cruz RN on 4/28/24 at 10:40 PM EDT    **SHARE this note so that the co-signing nurse can place an eSignature**    Nurse 2 eSignature: Electronically signed by Carrie Edmondson RN on 4/28/24 at 11:57 PM EDT

## 2024-04-29 NOTE — PROGRESS NOTES
[facility] - Clinical Pharmacy Note - Renal Dosing    Cefepime ordered for treatment of UTI. Per [facility] Renal Dose Adjustment Policy, Cefepime will be changed to 2000 mg Q24H.     Estimated Creatinine Clearance: Estimated Creatinine Clearance: 49 mL/min (based on SCr of 0.8 mg/dL).  Dialysis Status, NORMA, CKD: NA  BMI: Body mass index is 14.09 kg/m².    Rationale for Adjustment: Agent is renally eliminated.    Pharmacy will continue to monitor renal function, cultures and sensitivities (where available) and adjust dose as necessary.      Please call with any questions.      Ana Bell, PharmD  [id number] (Main Pharmacy)

## 2024-04-29 NOTE — CARE COORDINATION
Patient is from home with family and is independent at baseline. Patient anicipates having no needs at discharge but

## 2024-04-29 NOTE — PROGRESS NOTES
Patient admitted to 3312.  Alert and oriented x 4.  Vitals stable.  Temp 101.2, medicated with prn tylenol in ED before arrival to floor.  C/o L flank pain, medicated with prn norco.  Admission and 4 eye skin assessment complete.  IVF initiated, IV antibiotics.  Skin intact.  Gait steady.  Up ad lily.  Call light and plan of care reviewed.  Patient encouraged to call with needs and concerns.    Temp re-check 99.

## 2024-04-29 NOTE — PLAN OF CARE
Problem: Discharge Planning  Goal: Discharge to home or other facility with appropriate resources  Outcome: Progressing   From home with daughter.    Problem: Pain  Goal: Verbalizes/displays adequate comfort level or baseline comfort level  Outcome: Progressing   C/o L flank pain, medicated with prn norco.  Patient sleeping when pain reassessed.  Will continue to monitor pain.    Problem: Respiratory - Adult  Goal: Achieves optimal ventilation and oxygenation  Outcome: Progressing   Received albuterol treatment.  Lungs clear, diminished.  RA.    Problem: Skin/Tissue Integrity - Adult  Goal: Skin integrity remains intact  Outcome: Progressing   Skin intact.  Sacral heart to coccyx r/t bony prominence.  Patient repositions self.  Will continue to monitor s/s of skin breakdown.    Problem: Musculoskeletal - Adult  Goal: Return mobility to safest level of function  Outcome: Progressing   Independent with adl's.    Problem: Infection - Adult  Goal: Absence of infection at discharge  Outcome: Progressing   Receiving IV antibiotics, IVF.  Temp 101.5, tylenol administered.  Will continue to monitor s/s of infection.

## 2024-04-30 LAB
BACTERIA UR CULT: ABNORMAL
ORGANISM: ABNORMAL

## 2024-04-30 NOTE — PROGRESS NOTES
Pt rolled out of the hospital using wheelchair to the main entrance where [employer] D[employer] Lic # [id number]  her up. All belongings have been taken along with discharge paperwork. No questions at this time.

## 2024-05-04 NOTE — PROGRESS NOTES
Physician Progress Note      PATIENT:               CAITY BELL  CSN #:                  [id number]  :                       1960  ADMIT DATE:       2024 3:36 PM  DISCH DATE:        2024 8:00 PM  RESPONDING  PROVIDER #:        TIFFANIE BANERJEE          QUERY TEXT:    Pt admitted with acute left Pyelonephritis. Pt noted to have Elevated HR, T,   WBC. If possible, please document in the progress notes and discharge summary   if you are evaluating and /or treating any of the following:    The medical record reflects the following:  Risk Factors:  Pyelonephritis, Age 63yrs, female.  Clinical Indicators:  H&P  left flank pain radiating down to the left   lumbar area with associated chills but no overt fevers, dysuria with hematuria   concerning for acute left Pyelonephritis.  IM PN   Pyelonephritis of the left kidney-continue IV antibiotics with   cefepime, IV hydration, pain management continues to have significant pain.   U/C  positive for Escherichia coli.  WBC 14.2  T 102, 101.3  ,102,  Urine culture positive for Escherichia coli  Treatment: IV cefepime, Urine culture    Thank You,  Anel Delgadillo [facility], CDS  Options provided:  -- Sepsis due to acute left Pyelonephritis present on admission  -- Acute left Pyelonephritis without Sepsis  -- Other - I will add my own diagnosis  -- Disagree - Not applicable / Not valid  -- Disagree - Clinically unable to determine / Unknown  -- Refer to Clinical Documentation Reviewer    PROVIDER RESPONSE TEXT:    This patient has sepsis due to acute left Pyelonephritis which was present on   admission.    Query created by: Anel Delgadillo on 2024 1:23 AM      Electronically signed by:  TIFFANIE BANERJEE 2024 2:06 PM

## 2024-05-15 ENCOUNTER — HOSPITAL ENCOUNTER (OUTPATIENT)
Dept: CT IMAGING | Age: 64
Discharge: HOME | End: 2024-05-15
Payer: COMMERCIAL

## 2024-05-15 DIAGNOSIS — F17.210 CIGARETTE SMOKER: ICD-10-CM

## 2024-05-15 DIAGNOSIS — Z87.891 PERSONAL HISTORY OF TOBACCO USE, PRESENTING HAZARDS TO HEALTH: ICD-10-CM

## 2024-05-15 PROCEDURE — 71271 CT THORAX LUNG CANCER SCR C-: CPT

## 2024-05-17 ENCOUNTER — TELEPHONE (OUTPATIENT)
Dept: CT IMAGING | Age: 64
End: 2024-05-17

## 2024-05-17 NOTE — TELEPHONE ENCOUNTER
Baseline lung screen on 5/15/24. LRAD2. Recommended screen in one year. Imaging report faxed to ordering provider via [employer]. Results letter mailed.     Kym HOOKERN, RN   Lung Nodule Navigator  [facility]  [phone]

## 2025-05-13 ENCOUNTER — TELEPHONE (OUTPATIENT)
Dept: CASE MANAGEMENT | Age: 65
End: 2025-05-13

## 2025-07-01 ENCOUNTER — TELEPHONE (OUTPATIENT)
Dept: CASE MANAGEMENT | Age: 65
End: 2025-07-01

## 2025-08-06 ENCOUNTER — TELEPHONE (OUTPATIENT)
Dept: CASE MANAGEMENT | Age: 65
End: 2025-08-06